# Patient Record
Sex: MALE | URBAN - METROPOLITAN AREA
[De-identification: names, ages, dates, MRNs, and addresses within clinical notes are randomized per-mention and may not be internally consistent; named-entity substitution may affect disease eponyms.]

---

## 2018-11-01 ENCOUNTER — WORRISOME GROWTH - SEE NOTE (OUTPATIENT)
Dept: URBAN - METROPOLITAN AREA CLINIC 32 | Facility: CLINIC | Age: 63
Setting detail: DERMATOLOGY
End: 2018-11-01

## 2018-11-01 PROCEDURE — 99202 OFFICE O/P NEW SF 15 MIN: CPT

## 2018-11-01 PROCEDURE — 11100 BX SKIN SUBCUTANEOUS&/MUCOUS MEMBRANE 1 LESION: CPT

## 2018-11-01 PROCEDURE — 10060 I&D ABSCESS SIMPLE/SINGLE: CPT

## 2021-03-15 ENCOUNTER — IMPORTED ENCOUNTER (OUTPATIENT)
Dept: URBAN - METROPOLITAN AREA CLINIC 9 | Facility: CLINIC | Age: 66
End: 2021-03-15

## 2021-10-16 ASSESSMENT — KERATOMETRY
OD_AXISANGLE2_DEGREES: 82
OD_K2POWER_DIOPTERS: 42.25
OD_AXISANGLE_DEGREES: 172
OS_AXISANGLE_DEGREES: 164
OD_K1POWER_DIOPTERS: 40.5
OS_K1POWER_DIOPTERS: 41.5
OS_AXISANGLE2_DEGREES: 74
OS_K2POWER_DIOPTERS: 42

## 2021-10-16 ASSESSMENT — TONOMETRY
OD_IOP_MMHG: 10
OS_IOP_MMHG: 12

## 2021-10-16 ASSESSMENT — VISUAL ACUITY
OS_CC: 20/70 SN
OS_SC: 20/100 SN
OD_CC: 20/20 SN
OD_SC: 20/20 SN

## 2022-12-21 NOTE — PATIENT DISCUSSION
Discussed starting with Latanoprost only and returning to clinic as directed to assess further treatment.